# Patient Record
Sex: FEMALE | Race: BLACK OR AFRICAN AMERICAN | NOT HISPANIC OR LATINO | URBAN - METROPOLITAN AREA
[De-identification: names, ages, dates, MRNs, and addresses within clinical notes are randomized per-mention and may not be internally consistent; named-entity substitution may affect disease eponyms.]

---

## 2017-02-15 VITALS
TEMPERATURE: 98 F | RESPIRATION RATE: 16 BRPM | OXYGEN SATURATION: 99 % | HEART RATE: 69 BPM | SYSTOLIC BLOOD PRESSURE: 127 MMHG | DIASTOLIC BLOOD PRESSURE: 76 MMHG

## 2017-02-15 NOTE — H&P ADULT. - HISTORY OF PRESENT ILLNESS
****SKELETON:    61 y.o Female with PMHx of     She denies experiencing any CP, SOB, palpitations, dizziness, syncope, new PND/orthopnea, LE edema, or decrease in exercise tolerance.      Pt subsequently underwent a Nuclear Stress test on 01/27/17, which revealed moderate anterior ischemia, no EF commented on.      In light of pt's risk factors and abnormal Stress test, pt is now referred to [facility] for recommended Cardiac Cath with possible intervention if clinically indicated to r/o suspected underlying ischemia. ****SEVERE SHELLFISH ALLERGY-NEED TO PRE-MEDICATE UPON ARRIVAL     61 y.o Female, Current smoker,  with PMHx of   ***SEVERE SHELLFISH***, who was referred by her PMD for cardiac evaluation for new onset dizziness.  Pt reports experiencing intermittent episodes of dizziness particularly upon minimal exertion, ambulating less than 1-2 city blocks or when climbing less than one flight of stairs over the past "few" weeks.  Symptoms resolve after a few minutes of rest.  She denies experiencing any syncope, preceding CP, SOB, palpitations, new PND/orthopnea, LE edema, or decrease in exercise tolerance.  Pt subsequently underwent a Nuclear Stress test on 01/27/17, which revealed moderate anterior ischemia, no EF commented on.      In light of pt's risk factors, CCS Anginal Class 4 Symptoms, and abnormal Stress test, pt is now referred to [facility] for recommended Cardiac Cath with possible intervention if clinically indicated to r/o suspected underlying ischemia. ****SEVERE SHELLFISH ALLERGY****  61 y.o Female, Current smoker(pt reports quitting 2 weeks ago)  with PMHx of   ***SEVERE SHELLFISH ALLERGY***, HTN, who was referred by her PMD for cardiac evaluation for new onset dizziness.  Pt reports experiencing intermittent episodes of dizziness particularly upon minimal exertion, ambulating less than 1-2 city blocks or when climbing less than one flight of stairs over the past "few" weeks.  Symptoms resolve after a few minutes of rest.  She denies experiencing any syncope, preceding CP, SOB, palpitations, new PND/orthopnea, LE edema, or decrease in exercise tolerance.  Pt subsequently underwent a Nuclear Stress test on 01/27/17, which revealed moderate anterior ischemia, no EF commented on.      In light of pt's risk factors, CCS Anginal Class 4 Symptoms, and abnormal Stress test, pt is now referred to [facility] for recommended Cardiac Cath with possible intervention if clinically indicated to r/o suspected underlying ischemia.

## 2017-02-15 NOTE — H&P ADULT. - ASSESSMENT
61 y.o Female, Current smoker,  with PMHx of   ***SEVERE SHELLFISH***, who was referred by her PMD for cardiac evaluation for new onset dizziness CCS Class 4 Anginal Equivalent Symptoms in setting of abnormal NST now referred for recommended Cardiac Catheterization with intervention if clinically indicated.    **OF NOTE: Pt premedicated with Solucortef 200mg IVP x 1 prior to cath for severe shellfish allergy and Benadryl 50mg IVP x 1 ordered on call to Cath lab. 61 y.o Female, Current smoker(reports quitting 2 weeks ago,  with PMHx of   ***SEVERE SHELLFISH ALLERGY**, HTN, who was referred by her PMD for cardiac evaluation for new onset dizziness CCS Class 4 Anginal Equivalent Symptoms in setting of abnormal NST now referred for recommended Cardiac Catheterization with intervention if clinically indicated.    **OF NOTE: Pt premedicated with Solucortef 200mg IVP x 1 prior to cath for severe shellfish allergy and Benadryl 50mg IVP x 1 ordered on call to Cath lab. 61 y.o Female, Current smoker(reports quitting 2 weeks ago,  with PMHx of   ***SEVERE SHELLFISH ALLERGY**, HTN, who was referred by her PMD for cardiac evaluation for new onset dizziness CCS Class 4 Anginal Equivalent Symptoms in setting of abnormal NST now referred for recommended Cardiac Catheterization with intervention if clinically indicated.    **OF NOTE: Pt premedicated with Solucortef 200mg IVP x 1 prior to cath for severe shellfish allergy and Benadryl 50mg IVP x 1 ordered on call to Cath lab.  -Admission labs with WBC 2.8, Platelets 133, discussed with Dr. Vidal and would like ASA 325mg only  -K+ on arrival 3.7 repleted with 40mEq PO K-dur

## 2017-02-16 RX ORDER — ASPIRIN/CALCIUM CARB/MAGNESIUM 324 MG
1 TABLET ORAL
Qty: 0 | Refills: 0 | COMMUNITY

## 2017-02-17 ENCOUNTER — OUTPATIENT (OUTPATIENT)
Dept: OUTPATIENT SERVICES | Facility: HOSPITAL | Age: 62
LOS: 1 days | Discharge: NURSING, REHAB OR LONG-TERM CARE | End: 2017-02-17
Payer: COMMERCIAL

## 2017-02-17 DIAGNOSIS — I25.110 ATHEROSCLEROTIC HEART DISEASE OF NATIVE CORONARY ARTERY WITH UNSTABLE ANGINA PECTORIS: ICD-10-CM

## 2017-02-17 DIAGNOSIS — Z90.710 ACQUIRED ABSENCE OF BOTH CERVIX AND UTERUS: Chronic | ICD-10-CM

## 2017-02-17 DIAGNOSIS — F17.210 NICOTINE DEPENDENCE, CIGARETTES, UNCOMPLICATED: ICD-10-CM

## 2017-02-17 DIAGNOSIS — R94.39 ABNORMAL RESULT OF OTHER CARDIOVASCULAR FUNCTION STUDY: ICD-10-CM

## 2017-02-17 LAB
ALBUMIN SERPL ELPH-MCNC: 4 G/DL — SIGNIFICANT CHANGE UP (ref 3.4–5)
ALP SERPL-CCNC: 68 U/L — SIGNIFICANT CHANGE UP (ref 40–120)
ALT FLD-CCNC: 26 U/L — SIGNIFICANT CHANGE UP (ref 12–42)
ANION GAP SERPL CALC-SCNC: 11 MMOL/L — SIGNIFICANT CHANGE UP (ref 9–16)
APTT BLD: 35.6 SEC — SIGNIFICANT CHANGE UP (ref 27.5–37.4)
AST SERPL-CCNC: 16 U/L — SIGNIFICANT CHANGE UP (ref 15–37)
BASOPHILS NFR BLD AUTO: 0.4 % — SIGNIFICANT CHANGE UP (ref 0–2)
BILIRUB SERPL-MCNC: 0.8 MG/DL — SIGNIFICANT CHANGE UP (ref 0.2–1.2)
BUN SERPL-MCNC: 16 MG/DL — SIGNIFICANT CHANGE UP (ref 7–23)
CALCIUM SERPL-MCNC: 8.8 MG/DL — SIGNIFICANT CHANGE UP (ref 8.5–10.5)
CHLORIDE SERPL-SCNC: 102 MMOL/L — SIGNIFICANT CHANGE UP (ref 96–108)
CHOLEST SERPL-MCNC: 166 MG/DL — SIGNIFICANT CHANGE UP
CK MB CFR SERPL CALC: 2.4 NG/ML — SIGNIFICANT CHANGE UP (ref 0.5–3.6)
CO2 SERPL-SCNC: 28 MMOL/L — SIGNIFICANT CHANGE UP (ref 22–31)
CREAT SERPL-MCNC: 0.71 MG/DL — SIGNIFICANT CHANGE UP (ref 0.5–1.3)
CRP SERPL-MCNC: <0.29 MG/DL — SIGNIFICANT CHANGE UP
EOSINOPHIL NFR BLD AUTO: 2.2 % — SIGNIFICANT CHANGE UP (ref 0–6)
GLUCOSE SERPL-MCNC: 85 MG/DL — SIGNIFICANT CHANGE UP (ref 70–99)
HBA1C BLD-MCNC: 5.9 % — HIGH (ref 4.8–5.6)
HCT VFR BLD CALC: 38.3 % — SIGNIFICANT CHANGE UP (ref 34.5–45)
HDLC SERPL-MCNC: 68 MG/DL — SIGNIFICANT CHANGE UP
HGB BLD-MCNC: 13.2 G/DL — SIGNIFICANT CHANGE UP (ref 11.5–15.5)
INR BLD: 1.19 — HIGH (ref 0.88–1.16)
LIPID PNL WITH DIRECT LDL SERPL: 83 MG/DL — SIGNIFICANT CHANGE UP
LYMPHOCYTES # BLD AUTO: 44.9 % — HIGH (ref 13–44)
MCHC RBC-ENTMCNC: 29.9 PG — SIGNIFICANT CHANGE UP (ref 27–34)
MCHC RBC-ENTMCNC: 34.5 G/DL — SIGNIFICANT CHANGE UP (ref 32–36)
MCV RBC AUTO: 86.7 FL — SIGNIFICANT CHANGE UP (ref 80–100)
MONOCYTES NFR BLD AUTO: 9.1 % — SIGNIFICANT CHANGE UP (ref 2–14)
NEUTROPHILS NFR BLD AUTO: 43.4 % — SIGNIFICANT CHANGE UP (ref 43–77)
PLATELET # BLD AUTO: 133 K/UL — LOW (ref 150–400)
POTASSIUM SERPL-MCNC: 3.7 MMOL/L — SIGNIFICANT CHANGE UP (ref 3.5–5.3)
POTASSIUM SERPL-SCNC: 3.7 MMOL/L — SIGNIFICANT CHANGE UP (ref 3.5–5.3)
PROT SERPL-MCNC: 8.1 G/DL — SIGNIFICANT CHANGE UP (ref 6.4–8.2)
PROTHROM AB SERPL-ACNC: 13.2 SEC — HIGH (ref 10–13.1)
RBC # BLD: 4.42 M/UL — SIGNIFICANT CHANGE UP (ref 3.8–5.2)
RBC # FLD: 14.5 % — SIGNIFICANT CHANGE UP (ref 10.3–16.9)
SODIUM SERPL-SCNC: 141 MMOL/L — SIGNIFICANT CHANGE UP (ref 135–145)
TOTAL CHOLESTEROL/HDL RATIO MEASUREMENT: 2.4 RATIO — SIGNIFICANT CHANGE UP
TRIGL SERPL-MCNC: 76 MG/DL — SIGNIFICANT CHANGE UP
WBC # BLD: 2.8 K/UL — LOW (ref 3.8–10.5)
WBC # FLD AUTO: 2.8 K/UL — LOW (ref 3.8–10.5)

## 2017-02-17 PROCEDURE — 93458 L HRT ARTERY/VENTRICLE ANGIO: CPT | Mod: 26

## 2017-02-17 PROCEDURE — 86140 C-REACTIVE PROTEIN: CPT

## 2017-02-17 PROCEDURE — 85610 PROTHROMBIN TIME: CPT

## 2017-02-17 PROCEDURE — 80061 LIPID PANEL: CPT

## 2017-02-17 PROCEDURE — 83036 HEMOGLOBIN GLYCOSYLATED A1C: CPT

## 2017-02-17 PROCEDURE — 82550 ASSAY OF CK (CPK): CPT

## 2017-02-17 PROCEDURE — 93010 ELECTROCARDIOGRAM REPORT: CPT

## 2017-02-17 PROCEDURE — 93458 L HRT ARTERY/VENTRICLE ANGIO: CPT

## 2017-02-17 PROCEDURE — C1769: CPT

## 2017-02-17 PROCEDURE — 85730 THROMBOPLASTIN TIME PARTIAL: CPT

## 2017-02-17 PROCEDURE — 93005 ELECTROCARDIOGRAM TRACING: CPT

## 2017-02-17 PROCEDURE — C1887: CPT

## 2017-02-17 PROCEDURE — 82553 CREATINE MB FRACTION: CPT

## 2017-02-17 PROCEDURE — 80053 COMPREHEN METABOLIC PANEL: CPT

## 2017-02-17 PROCEDURE — 85025 COMPLETE CBC W/AUTO DIFF WBC: CPT

## 2017-02-17 RX ORDER — ASPIRIN/CALCIUM CARB/MAGNESIUM 324 MG
325 TABLET ORAL ONCE
Qty: 0 | Refills: 0 | Status: COMPLETED | OUTPATIENT
Start: 2017-02-17 | End: 2017-02-17

## 2017-02-17 RX ORDER — DIPHENHYDRAMINE HCL 50 MG
50 CAPSULE ORAL ONCE
Qty: 0 | Refills: 0 | Status: DISCONTINUED | OUTPATIENT
Start: 2017-02-17 | End: 2017-02-17

## 2017-02-17 RX ORDER — SODIUM CHLORIDE 9 MG/ML
500 INJECTION INTRAMUSCULAR; INTRAVENOUS; SUBCUTANEOUS
Qty: 0 | Refills: 0 | Status: DISCONTINUED | OUTPATIENT
Start: 2017-02-17 | End: 2017-02-17

## 2017-02-17 RX ORDER — CHLORHEXIDINE GLUCONATE 213 G/1000ML
1 SOLUTION TOPICAL ONCE
Qty: 0 | Refills: 0 | Status: DISCONTINUED | OUTPATIENT
Start: 2017-02-17 | End: 2017-02-17

## 2017-02-17 RX ORDER — POTASSIUM CHLORIDE 20 MEQ
40 PACKET (EA) ORAL ONCE
Qty: 0 | Refills: 0 | Status: COMPLETED | OUTPATIENT
Start: 2017-02-17 | End: 2017-02-17

## 2017-02-17 RX ORDER — HYDROCORTISONE 20 MG
200 TABLET ORAL ONCE
Qty: 0 | Refills: 0 | Status: COMPLETED | OUTPATIENT
Start: 2017-02-17 | End: 2017-02-17

## 2017-02-17 RX ADMIN — SODIUM CHLORIDE 75 MILLILITER(S): 9 INJECTION INTRAMUSCULAR; INTRAVENOUS; SUBCUTANEOUS at 14:13

## 2017-02-17 RX ADMIN — Medication 325 MILLIGRAM(S): at 14:14

## 2017-02-17 RX ADMIN — Medication 200 MILLIGRAM(S): at 13:29

## 2017-02-17 RX ADMIN — Medication 40 MILLIEQUIVALENT(S): at 14:14

## 2017-02-17 NOTE — PROGRESS NOTE ADULT - SUBJECTIVE AND OBJECTIVE BOX
Interventional Cardiology PA SDA Discharge Note    Patient without complaints.    Afebrile, VSS    Ext:    	  		Right Radial : no   hematoma, no    bleeding, dressing; C/D/I      Pulses:    intact RAD to baseline     A/P:  61 y.o Female, Current smoker(reports quitting 2 weeks ago,  with PMHx of   ***SEVERE SHELLFISH ALLERGY**, HTN, who was referred by her PMD for cardiac evaluation for new onset dizziness CCS Class 4 Anginal Equivalent Symptoms in setting of abnormal NST underwent a recommended Cardiac Catheterization which revealed:     LAD-mild luminal irregularities, LM/Lcx/RCA normal, LVEF 60%, EDP 3    Recommends medical management        1.	Stable for discharge as per attending DrPaola ____Young_____ after bed rest, pt voids, groin/wrist stable and 30 minutes of ambulation.  2.	Follow-up with PMD/Cardiologist ____Paul_______ in 1-2 weeks  3.	Discharged forms signed and copies in chart